# Patient Record
(demographics unavailable — no encounter records)

---

## 2025-04-30 NOTE — DISCUSSION/SUMMARY
[FreeTextEntry1] : 1) SHAHID: Concern for CAD and LV dysfunction. Will order stress test and echocardiogram.  2) Abnormal ECG: Echo and stress test ordered  3) HTN: C/w Amlodipine 5mg QD 4) HLD: C/w atorvastatin 80mg QD 5) Exercise counseling: Discussed and encouraged exercise and heart healthy diet with patient. 6) Smoking: Smokes 1 1/2 pack per day. Discussed cessation.  [EKG obtained to assist in diagnosis and management of assessed problem(s)] : EKG obtained to assist in diagnosis and management of assessed problem(s)

## 2025-04-30 NOTE — HISTORY OF PRESENT ILLNESS
[FreeTextEntry1] : 57 year old male here with HLD.   Was told he has high cholesterol.  Has some shortness of breath on exertion.  Grandfather had a weak heart.  Smokes 1 1/2 pack per day. Eats a lot of steak and some veggies.